# Patient Record
Sex: MALE | Race: ASIAN | NOT HISPANIC OR LATINO | URBAN - METROPOLITAN AREA
[De-identification: names, ages, dates, MRNs, and addresses within clinical notes are randomized per-mention and may not be internally consistent; named-entity substitution may affect disease eponyms.]

---

## 2017-01-01 ENCOUNTER — COMMUNICATION - HEALTHEAST (OUTPATIENT)
Dept: FAMILY MEDICINE | Facility: CLINIC | Age: 52
End: 2017-01-01

## 2017-01-01 ENCOUNTER — OFFICE VISIT - HEALTHEAST (OUTPATIENT)
Dept: FAMILY MEDICINE | Facility: CLINIC | Age: 52
End: 2017-01-01

## 2017-01-01 DIAGNOSIS — R60.0 PERIPHERAL EDEMA: ICD-10-CM

## 2017-01-01 DIAGNOSIS — R05.9 COUGH: ICD-10-CM

## 2017-01-01 DIAGNOSIS — K74.60 CIRRHOSIS (H): ICD-10-CM

## 2017-01-01 DIAGNOSIS — Z09 HOSPITAL DISCHARGE FOLLOW-UP: ICD-10-CM

## 2017-01-01 DIAGNOSIS — B18.1 CHRONIC HEPATITIS B (H): ICD-10-CM

## 2017-01-01 DIAGNOSIS — R73.9 HYPERGLYCEMIA: ICD-10-CM

## 2017-01-01 LAB — HBA1C MFR BLD: 4.7 % (ref 3.5–6)

## 2018-01-01 ENCOUNTER — COMMUNICATION - HEALTHEAST (OUTPATIENT)
Dept: PULMONOLOGY | Facility: OTHER | Age: 53
End: 2018-01-01

## 2018-01-01 ENCOUNTER — RECORDS - HEALTHEAST (OUTPATIENT)
Dept: ADMINISTRATIVE | Facility: OTHER | Age: 53
End: 2018-01-01

## 2018-01-01 ENCOUNTER — RECORDS - HEALTHEAST (OUTPATIENT)
Dept: INTENSIVE CARE | Facility: HOSPITAL | Age: 53
End: 2018-01-01

## 2018-01-01 ENCOUNTER — ANESTHESIA - HEALTHEAST (OUTPATIENT)
Dept: INTENSIVE CARE | Facility: HOSPITAL | Age: 53
End: 2018-01-01

## 2018-01-01 ENCOUNTER — OFFICE VISIT - HEALTHEAST (OUTPATIENT)
Dept: FAMILY MEDICINE | Facility: CLINIC | Age: 53
End: 2018-01-01

## 2018-01-01 DIAGNOSIS — R05.9 COUGH: ICD-10-CM

## 2018-01-01 DIAGNOSIS — K74.60 CIRRHOSIS (H): ICD-10-CM

## 2018-01-01 DIAGNOSIS — B18.1 CHRONIC HEPATITIS B (H): ICD-10-CM

## 2018-01-01 DIAGNOSIS — M79.89 LEG SWELLING: ICD-10-CM

## 2018-01-01 DIAGNOSIS — R18.8 OTHER ASCITES: ICD-10-CM

## 2018-01-01 RX ORDER — SPIRONOLACTONE 25 MG/1
50 TABLET ORAL DAILY
Qty: 60 TABLET | Refills: 2 | Status: SHIPPED | OUTPATIENT
Start: 2018-01-01

## 2018-01-01 RX ORDER — DEXTROMETHORPHAN HBR, GUAIFENESIN 20; 200 MG/10ML; MG/10ML
SOLUTION ORAL
Qty: 240 ML | Refills: 0 | Status: SHIPPED | OUTPATIENT
Start: 2018-01-01

## 2018-01-01 RX ORDER — FUROSEMIDE 20 MG
40 TABLET ORAL DAILY
Qty: 60 TABLET | Refills: 2 | Status: SHIPPED | OUTPATIENT
Start: 2018-01-01

## 2021-05-29 ENCOUNTER — RECORDS - HEALTHEAST (OUTPATIENT)
Dept: ADMINISTRATIVE | Facility: CLINIC | Age: 56
End: 2021-05-29

## 2021-05-30 ENCOUNTER — RECORDS - HEALTHEAST (OUTPATIENT)
Dept: ADMINISTRATIVE | Facility: CLINIC | Age: 56
End: 2021-05-30

## 2021-05-31 ENCOUNTER — RECORDS - HEALTHEAST (OUTPATIENT)
Dept: ADMINISTRATIVE | Facility: CLINIC | Age: 56
End: 2021-05-31

## 2021-05-31 VITALS — BODY MASS INDEX: 32.19 KG/M2 | WEIGHT: 176 LBS

## 2021-05-31 VITALS — BODY MASS INDEX: 30.73 KG/M2 | WEIGHT: 168 LBS

## 2021-06-01 ENCOUNTER — RECORDS - HEALTHEAST (OUTPATIENT)
Dept: ADMINISTRATIVE | Facility: CLINIC | Age: 56
End: 2021-06-01

## 2021-06-14 NOTE — PROGRESS NOTES
Subjective: This patient comes in for hospital discharge follow-up.  This is a 52-year-old male patient has been hospitalized at [facility] from 12/11 through 12/12/17.    Patient had ascites he has hepatitis B cirrhosis has not been taking his medicine    Platelets on 12/11/2017 was 66,000 on 12/12/2017 92,000.    Patient's BMP was normal except calcium was low I did repeat that today    He should restart it is via read he has been on that but also now on Lasix 20 mg a day and Spironolactone 25 mg a day.  His weight is down 12 pounds his swelling is come down.  Both in his abdomen and in his extremities.    They did do a paracentesis got 600 mL.  I did review the pathology no sign of infection.    Liver tests showed AST 64 ALT 40 lipase 114.    Scan with an ultrasound showed the liver cirrhosis but otherwise unremarkable and no evidence of DVT on ultrasound.    His legs are no longer uncomfortable his abdomen is softer he states he is eating okay no vomiting diarrhea.  Denies any shortness of breath.    Tobacco status: He  reports that he has never smoked. He has never used smokeless tobacco.    Patient Active Problem List    Diagnosis Date Noted     Ascites 12/11/2017     Other cirrhosis of liver 12/11/2017     Leg swelling 12/11/2017     Left upper quadrant pain 12/11/2017     Chronic hepatitis B 12/04/2017     Leg wound, right 12/01/2016     Cellulitis 12/01/2016     Benign Prostatic Hypertrophy      Excessive Thirst      Gastric Ulcer      Carrier Of Viral Hepatitis Type B      Tinnitus      Blurry Vision      Urinary Frequency Increased        Current Outpatient Prescriptions   Medication Sig Dispense Refill     furosemide (LASIX) 20 MG tablet Take 1 tablet (20 mg total) by mouth daily. 30 tablet 0     spironolactone (ALDACTONE) 25 MG tablet Take 1 tablet (25 mg total) by mouth daily. 30 tablet 0     tenofovir disoproxil (VIREAD) 300 mg tablet Take 1 tablet (300 mg total) by mouth daily. 30 tablet 0      dextromethorphan-guaifenesin (ROBAFEN DM COUGH)  mg/5 mL Liqd 10 ml po tid prn cough 240 mL 0     No current facility-administered medications for this visit.        ROS:   10 point review of systems negative other than as outlined above    Objective:    Weight 167 down 12 pounds    Blood pressure 114/60 pulse 64 O2 sat 97% temperature 97.9    General appearance no acute distress    HEENT neck without JVD oropharynx is clear no scleral icterus    Abdomen is soft bowel sounds are normal no guarding or rebound no mass appreciated no shifting dullness.    No significant ascites noted on palpation    Extremities with trace edema only there is no redness or warmth or swelling.    Lungs are clear no rales or rhonchi, heart was regular S1-S2 rate and 60s.    No joint swelling redness or warmth.  No asterixis    Labs with renal function still stable sodium 135.    Results for orders placed or performed in visit on 12/19/17   Basic Metabolic Panel   Result Value Ref Range    Sodium 135 (L) 136 - 145 mmol/L    Potassium 4.0 3.5 - 5.0 mmol/L    Chloride 107 98 - 107 mmol/L    CO2 20 (L) 22 - 31 mmol/L    Anion Gap, Calculation 8 5 - 18 mmol/L    Glucose 84 70 - 125 mg/dL    Calcium 7.8 (L) 8.5 - 10.5 mg/dL    BUN 12 8 - 22 mg/dL    Creatinine 0.83 0.70 - 1.30 mg/dL    GFR MDRD Af Amer >60 >60 mL/min/1.73m2    GFR MDRD Non Af Amer >60 >60 mL/min/1.73m2       Assessment:  1. Hospital discharge follow-up     2. Chronic hepatitis B     3. Cirrhosis  Basic Metabolic Panel   4. Peripheral edema  Basic Metabolic Panel   5. Cough  dextromethorphan-guaifenesin (ROBAFEN DM COUGH)  mg/5 mL Liqd     Continue on the Lasix and spironolactone recheck in 4 weeks.    Cirrhosis-chronic hepatitis B improved ascites and edema improved with the diuresis    Continue on the via read    He has had a cough intermittently I did give some some Robitussin with    Plan: As outlined above recheck in 4 weeks    I reviewed labs ultrasounds  paracentesis, consults from [facility] as well as discharge summary from the hospital    Med reconciliation done    This transcription uses voice recognition software, which may contain typographical errors.

## 2021-06-14 NOTE — PROGRESS NOTES
Subjective: Patient follows up.  He is a 52-year-old male.  He has not been in to see me for a year or more.    Has chronic history of hepatitis B with cirrhosis he has not been taking his hepatitis B medication.  He has not seen [facility] since September 2016.    I strongly discussed his poor compliance and ramifications regarding this.  He was seen in the emergency room room on 11/28/2017 and had lower extremity edema 2+.  He had an ultrasound which showed cirrhotic liver mild splenomegaly.  Chest x-ray was negative    Additional labs showed blood sugar 162 BUN was 8 creatinine 0.82 GFR was over 60    Bilirubin 4.0  .    Back in August 2016 AST was 40 ALT was 27    He has low albumin at 2.2    BNP was 175 normal thyroid hemoglobin 11.4 platelets were low at 87,000.    Patient absolutely needs to see [facility] again and get back on medication.  Turns out he does have an appointment for an ultrasound 12/21/2017 and an appointment with Dr. John January 4..  I told him it was absolutely necessary for him to see the specialist and get back on medication.    His swelling has improved its only trace bilaterally at this time.    I did check additional labs with urine.  There was no evidence of protein, nonfasting blood sugar was elevated but A1c was only 4.7.  Tobacco status: He  reports that he has never smoked. He has never used smokeless tobacco.    Patient Active Problem List    Diagnosis Date Noted     Chronic hepatitis B 12/04/2017     Leg wound, right 12/01/2016     Cellulitis 12/01/2016     Benign Prostatic Hypertrophy      Excessive Thirst      Gastric Ulcer      Carrier Of Viral Hepatitis Type B      Tinnitus      Blurry Vision      Urinary Frequency Increased        No current outpatient prescriptions on file.     No current facility-administered medications for this visit.        ROS:   10 point review of systems positive as outlined otherwise negative, denies any hemoptysis or abdominal  pain or change in stool.    Denies any shortness of breath or chest pressure although does get a little winded with increased activity.    Chest x-ray was negative.    Objective:    /62 (Patient Site: Left Arm, Patient Position: Sitting, Cuff Size: Adult Regular)  Pulse 72  Resp 16  Wt 168 lb (76.2 kg)  BMI 30.73 kg/m2  Body mass index is 30.73 kg/(m^2).      General appearance no acute distress    Skin without jaundice change eyes without scleral icterus    HEENT oropharynx was clear pupils react normally    Lungs clear no rales or rhonchi heart regular rate in 70s    Abdomen is soft bowel sounds normal no guarding or rebound.  Back without CVA pain    Extremities with trace edema.  Normal pulses.    He has evidence of JVD.    Urine negative for protein A1c 4.7 blood sugar 160    Results for orders placed or performed in visit on 12/04/17   Urinalysis   Result Value Ref Range    Color, UA Yellow Colorless, Yellow, Straw, Light Yellow    Clarity, UA Clear Clear    Glucose, UA Negative Negative    Bilirubin, UA Small (!) Negative    Ketones, UA Negative Negative    Specific Gravity, UA 1.010 1.005 - 1.030    Blood, UA Negative Negative    pH, UA 6.5 5.0 - 8.0    Protein, UA Negative Negative mg/dL    Urobilinogen, UA 1.0 E.U./dL 0.2 E.U./dL, 1.0 E.U./dL    Nitrite, UA Negative Negative    Leukocytes, UA Negative Negative   Glycosylated Hemoglobin A1c   Result Value Ref Range    Hemoglobin A1c 4.7 3.5 - 6.0 %   Glucose   Result Value Ref Range    Glucose 160 (H) 74 - 125 mg/dL    Patient Fasting > 8hrs? No        Assessment:  1. Chronic hepatitis B     2. Cirrhosis     3. Peripheral edema  Urinalysis   4. Hyperglycemia  Glucose    Glycosylated Hemoglobin A1c    Glucose     Chronic hepatitis B, poor compliance regarding follow-up with GI, poor compliance on medications    Cirrhotic changes on liver ultrasound.    Peripheral edema only trace now, secondary to the liver dysfunction and cirrhosis.  Hyperglycemia  but no evidence of diabetes.    Thrombocytopenia secondary to liver disease    Plan: As outlined above recheck 6 weeks    This transcription uses voice recognition software, which may contain typographical errors.

## 2021-06-15 NOTE — PROGRESS NOTES
Subjective: This patient comes in for follow-up.  He was hospitalized with ascites from hepatitis B cirrhosis platelets were fairly low please see previous discussion    He still in his by read.  Also Lasix 20 mg a day and Spironolactone 25 mg a day.  His weight is up 9 pounds from 12/19/2017.    He did see [facility] and had labs.  I reviewed the BMP which was normal GFR was normal but his AFP was elevated at 80.5.  He is getting an MRI of his liver on 1/29/2018 and then follows up there on 2/17/2018.    Unfortunately he has had some increased swelling it is more in the legs.  He does not feel overly bloated.    No shortness of breath or chest pain, no rashes no bleeding    Tobacco status: He  reports that he has never smoked. He has never used smokeless tobacco.    Patient Active Problem List    Diagnosis Date Noted     Elevated AFP 01/08/2018     Ascites 12/11/2017     Other cirrhosis of liver 12/11/2017     Leg swelling 12/11/2017     Left upper quadrant pain 12/11/2017     Chronic hepatitis B 12/04/2017     Leg wound, right 12/01/2016     Cellulitis 12/01/2016     Benign Prostatic Hypertrophy      Excessive Thirst      Gastric Ulcer      Carrier Of Viral Hepatitis Type B      Tinnitus      Blurry Vision      Urinary Frequency Increased        Current Outpatient Prescriptions   Medication Sig Dispense Refill     dextromethorphan-guaifenesin (ROBAFEN DM COUGH)  mg/5 mL Liqd 10 ml po tid prn cough 240 mL 0     furosemide (LASIX) 20 MG tablet Take 2 tablets (40 mg total) by mouth daily. 60 tablet 2     spironolactone (ALDACTONE) 25 MG tablet Take 2 tablets (50 mg total) by mouth daily. 60 tablet 2     tenofovir disoproxil (VIREAD) 300 mg tablet Take 1 tablet (300 mg total) by mouth daily. 30 tablet 0     No current facility-administered medications for this visit.        ROS:   10 point review of systems positive as outlined otherwise negative    Objective:    /70 (Patient Site: Left Arm, Patient  Position: Sitting, Cuff Size: Adult Large)  Pulse 64  Resp 16  Wt 176 lb (79.8 kg)  BMI 32.19 kg/m2  Body mass index is 32.19 kg/(m^2).      General appearance no acute distress    Weight is up 9 pounds to 176 vital signs otherwise stable    Lungs are clear throughout no rales or rhonchi, heart regular S1-S2 no JVD    Abdomen soft bowel sounds normal I do not palpate any abnormalities or tenderness through the liver area.  No overt ascites.    No lower back dependent edema    He does have 1+ edema to the proximal tibia bilaterally.  Negative redness warmth pain negative Homans sign.    Labs from 1/4/2018 at [facility] reviewed as outlined above    Assessment:  1. Chronic hepatitis B  spironolactone (ALDACTONE) 25 MG tablet    furosemide (LASIX) 20 MG tablet   2. Cough  dextromethorphan-guaifenesin (ROBAFEN DM COUGH)  mg/5 mL Liqd   3. Cirrhosis     4. Other ascites     5. Leg swelling       Chronic hepatitis B with cirrhosis history of ascites and some ongoing leg swelling    Increase Lasix to 20 mg 2 tablets a day.  Increase prolactin to 25 mg 2 tablets a day.  Continue on viread    Plan: Recheck in 4 weeks we will recheck renal function.    Await ongoing workup from [facility]    Continue to abstain from alcohol    Mild cough okay to use Robitussin-DM    This transcription uses voice recognition software, which may contain typographical errors.

## 2021-06-16 PROBLEM — B19.10 CIRRHOSIS OF LIVER DUE TO HEPATITIS B (H): Status: ACTIVE | Noted: 2017-01-01

## 2021-06-16 PROBLEM — M79.89 LEG SWELLING: Status: ACTIVE | Noted: 2017-01-01

## 2021-06-16 PROBLEM — R10.12 LEFT UPPER QUADRANT PAIN: Status: ACTIVE | Noted: 2017-01-01

## 2021-06-16 PROBLEM — R18.8 ASCITES: Status: ACTIVE | Noted: 2017-01-01

## 2021-06-16 PROBLEM — K74.60 CIRRHOSIS OF LIVER DUE TO HEPATITIS B (H): Status: ACTIVE | Noted: 2017-01-01

## 2021-06-16 PROBLEM — B18.1 CHRONIC HEPATITIS B (H): Status: ACTIVE | Noted: 2017-01-01

## 2021-06-16 PROBLEM — A41.51 SEPSIS DUE TO ESCHERICHIA COLI (H): Status: ACTIVE | Noted: 2018-01-01

## 2021-06-16 PROBLEM — R77.2 ELEVATED AFP: Status: ACTIVE | Noted: 2018-01-01

## 2021-06-16 NOTE — ANESTHESIA PROCEDURE NOTES
Emergent Intubation  Date/Time: 3/8/2018 5:40 AM  Anesthesiologist: MODESTO ABREU  Performing CRNA: HUNG BETANCOURT  Indications: respiratory failure  Route: oral  Technique: fiberoptic assisted  Tube size: 8.0 mm  Tube type: cuffed  Cuff inflated: yes  Level of Difficulty: 0ETT to lip: 22 cm  Tube secured with: ETT ewing  ETCO2 = Yes  Breath sounds: equal  SaO2 %: 95    Sign out given. CXR and sedation per primary care team.  Comments: Called to ICU to intubate pt in resp distress.  Report obtained, pt NPO>12 hours, K 5.5,  propofol given 50 mg IV x2, cricoid pressure requested by ICU staff, closed cords viewed via glidescope, MDA notified, pt ambued.  Rocuronium 30 mg IV given upon MDA arrival, MDA intubated via glidescope with ease, cords open. BBSE, dentition unchanged from preop status. Sats 95%, tube secured by RT, pt placed on vent, CXR ordered.

## 2021-06-16 NOTE — ANESTHESIA CARE TRANSFER NOTE
Last vitals:   Vitals:    03/08/18 0445   BP:    Pulse: 90   Resp: (!) 29   Temp:    SpO2: 94%   /70  Patient's level of consciousness is unresponsive  Spontaneous respirations: no  Maintains airway independently: no: intubated  Dentition unchanged: yes  Oropharynx: oropharynx clear of all foreign objects and endotracheal tube in place    QCDR Measures:  ASA# 20 - Surgical No Data Recorded  PQRS# 430 - Adult PONV Prevention: NA - Not adult patient, not GA or 3 or more risk factors NOT present  ASA# 8 - Peds PONV Prevention: NA - Not pediatric patient, not GA or 2 or more risk factors NOT present  PQRS# 424 - [facility]ita-op Temp Management: [id number] - Body temp not recorded within timeframe  PQRS# 426 - PACU Transfer Protocol: - Transfer of care checklist used  ASA# 14 - Acute Post-op Pain: ASA14B - Patient did NOT experience pain >= 7 out of 10